# Patient Record
Sex: MALE | Race: WHITE | URBAN - METROPOLITAN AREA
[De-identification: names, ages, dates, MRNs, and addresses within clinical notes are randomized per-mention and may not be internally consistent; named-entity substitution may affect disease eponyms.]

---

## 2022-02-21 ENCOUNTER — OFFICE VISIT (OUTPATIENT)
Dept: INTERNAL MEDICINE CLINIC | Age: 22
End: 2022-02-21
Payer: MEDICAID

## 2022-02-21 VITALS
SYSTOLIC BLOOD PRESSURE: 138 MMHG | DIASTOLIC BLOOD PRESSURE: 82 MMHG | BODY MASS INDEX: 27.08 KG/M2 | WEIGHT: 211 LBS | HEART RATE: 90 BPM | HEIGHT: 74 IN

## 2022-02-21 DIAGNOSIS — E55.9 VITAMIN D DEFICIENCY: ICD-10-CM

## 2022-02-21 DIAGNOSIS — R61 EXCESSIVE SWEATING: ICD-10-CM

## 2022-02-21 DIAGNOSIS — E78.2 MIXED HYPERLIPIDEMIA: ICD-10-CM

## 2022-02-21 DIAGNOSIS — F17.200 TOBACCO USE DISORDER: ICD-10-CM

## 2022-02-21 DIAGNOSIS — I10 PRIMARY HYPERTENSION: ICD-10-CM

## 2022-02-21 DIAGNOSIS — F15.11 HISTORY OF AMPHETAMINE ABUSE (HCC): ICD-10-CM

## 2022-02-21 DIAGNOSIS — S69.91XS THUMB INJURY, RIGHT, SEQUELA: Primary | ICD-10-CM

## 2022-02-21 PROCEDURE — G8427 DOCREV CUR MEDS BY ELIG CLIN: HCPCS | Performed by: INTERNAL MEDICINE

## 2022-02-21 PROCEDURE — 99204 OFFICE O/P NEW MOD 45 MIN: CPT | Performed by: INTERNAL MEDICINE

## 2022-02-21 PROCEDURE — G8419 CALC BMI OUT NRM PARAM NOF/U: HCPCS | Performed by: INTERNAL MEDICINE

## 2022-02-21 PROCEDURE — G8484 FLU IMMUNIZE NO ADMIN: HCPCS | Performed by: INTERNAL MEDICINE

## 2022-02-21 PROCEDURE — 4004F PT TOBACCO SCREEN RCVD TLK: CPT | Performed by: INTERNAL MEDICINE

## 2022-02-21 RX ORDER — PHENOL 1.4 %
AEROSOL, SPRAY (ML) MUCOUS MEMBRANE NIGHTLY
COMMUNITY

## 2022-02-21 RX ORDER — GLYCOPYRROLATE 1.5 MG/1
1 TABLET ORAL 3 TIMES DAILY PRN
Qty: 90 TABLET | Refills: 0 | Status: SHIPPED | OUTPATIENT
Start: 2022-02-21

## 2022-02-21 RX ORDER — IBUPROFEN 600 MG/1
600 TABLET ORAL EVERY 6 HOURS PRN
COMMUNITY

## 2022-02-21 RX ORDER — CLONIDINE 0.1 MG/24H
1 PATCH, EXTENDED RELEASE TRANSDERMAL WEEKLY
COMMUNITY

## 2022-02-21 SDOH — ECONOMIC STABILITY: FOOD INSECURITY: WITHIN THE PAST 12 MONTHS, YOU WORRIED THAT YOUR FOOD WOULD RUN OUT BEFORE YOU GOT MONEY TO BUY MORE.: SOMETIMES TRUE

## 2022-02-21 SDOH — ECONOMIC STABILITY: FOOD INSECURITY: WITHIN THE PAST 12 MONTHS, THE FOOD YOU BOUGHT JUST DIDN'T LAST AND YOU DIDN'T HAVE MONEY TO GET MORE.: SOMETIMES TRUE

## 2022-02-21 ASSESSMENT — ENCOUNTER SYMPTOMS
SORE THROAT: 0
ABDOMINAL PAIN: 0
CONSTIPATION: 0
COUGH: 0
BACK PAIN: 0
CHEST TIGHTNESS: 0
COLOR CHANGE: 0
NAUSEA: 0
VOMITING: 0
SHORTNESS OF BREATH: 0
WHEEZING: 0

## 2022-02-21 ASSESSMENT — PATIENT HEALTH QUESTIONNAIRE - PHQ9
SUM OF ALL RESPONSES TO PHQ QUESTIONS 1-9: 0
SUM OF ALL RESPONSES TO PHQ QUESTIONS 1-9: 0
2. FEELING DOWN, DEPRESSED OR HOPELESS: 0
SUM OF ALL RESPONSES TO PHQ QUESTIONS 1-9: 0
SUM OF ALL RESPONSES TO PHQ QUESTIONS 1-9: 0
SUM OF ALL RESPONSES TO PHQ9 QUESTIONS 1 & 2: 0
1. LITTLE INTEREST OR PLEASURE IN DOING THINGS: 0

## 2022-02-21 ASSESSMENT — SOCIAL DETERMINANTS OF HEALTH (SDOH): HOW HARD IS IT FOR YOU TO PAY FOR THE VERY BASICS LIKE FOOD, HOUSING, MEDICAL CARE, AND HEATING?: SOMEWHAT HARD

## 2022-02-21 NOTE — ASSESSMENT & PLAN NOTE
Counseled regarding need for smoking cessation, he will start nicotine lozenges and will reevaluate in 3 months

## 2022-02-21 NOTE — ASSESSMENT & PLAN NOTE
Patient started on clonidine patches at the rehab facility, today's blood pressure is within reasonable control, counseled regarding need for maintaining low-salt low-fat diet, smoking cessation, he did have lab work completed about 3 weeks ago and he will have a copy faxed to the office.   Will reevaluate in 3 months

## 2022-02-21 NOTE — ASSESSMENT & PLAN NOTE
Currently enrolled in inpatient rehab with [facility] program, will continue to follow along.   He is reporting last use to be December 1, reinforced recommendation for complete abstinence even after release from rehab reinforced need to avoid smoking weed or use any substance with potential for habit-forming and addiction

## 2022-02-21 NOTE — PROGRESS NOTES
ASSESSMENT/PLAN:  1. Thumb injury, right, sequela  Assessment & Plan:   Patient reporting-year-old cut over the tendon the right thumb, currently with limited ability to actively flex right thumb DIP. Advised will refer to hand surgery for further evaluation and treatment if any is still recommended since the injury is dating back to 1 year however recommended he continues with exercises as this may help regain some of the function  Orders:  -     Polly Medina MD, Hand Surgery (Hand, Wrist, Upper Extremity), [facility]  2. History of amphetamine abuse [facility])  Assessment & Plan:   Currently enrolled in inpatient rehab with [facility], will continue to follow along. He is reporting last use to be December 1, reinforced recommendation for complete abstinence even after release from rehab reinforced need to avoid smoking weed or use any substance with potential for habit-forming and addiction  3. Primary hypertension  Assessment & Plan:   Patient started on clonidine patches at the rehab facility, today's blood pressure is within reasonable control, counseled regarding need for maintaining low-salt low-fat diet, smoking cessation, he did have lab work completed about 3 weeks ago and he will have a copy faxed to the office. Will reevaluate in 3 months  4. Vitamin D deficiency  5. Mixed hyperlipidemia  Assessment & Plan:   As stated above will obtain copy of lab work, he is currently on fish oil supplement, reinforced recommendations for diet and exercise as other means to help control cholesterol and lower risk for coronary artery disease  6. Tobacco use disorder  Assessment & Plan:   Counseled regarding need for smoking cessation, he will start nicotine lozenges and will reevaluate in 3 months  7. Excessive sweating  Assessment & Plan:    We will do a trial of glycopyrrolate and reevaluate in 3 months  Orders:  -     Glycopyrrolate 1.5 MG TABS; Take 1 tablet by mouth 3 times daily as needed (excessive sweating), Disp-90 tablet, R-0Normal      Return in about 3 months (around 5/21/2022). SUBJECTIVE  HPI:   Patient here to establish new patient office visits and complaining of thumb problem on the right-hand side  He is a 75-year-old male who was released from shelter January 31 into inpatient rehab program at [facility] due to history of amphetamine abuse. He is reporting last use to be around December 1. Is referred from [facility] today to establish new PCP office visit, he did have complete lab work 3 weeks ago however he forgot to bring a copy with him today, he was started on clonidine patches 2 weeks ago after his blood pressure continued to be elevated  He is currently an everyday smoker however he is picking up nicotine lozenges today to start using to help him with smoking cessation. He injured his thumb about a year ago while trying to hang lights on the wall and ended up sustaining a cut. States at that time he is pretty sure he did cut through the tendon and he went to urgent care where he had stitches placed but no further intervention. Ever since he has been having some discomfort and pain but what is bothering him the most is that he is not able to bend the thumb completely on his own. He also complaining of excessive sweating of both feet and wants to know if there is something he can take to help with that      Review of Systems   Constitutional: Negative for activity change, appetite change, fatigue and unexpected weight change. HENT: Negative for congestion, hearing loss, mouth sores and sore throat. Eyes: Negative for visual disturbance. Respiratory: Negative for cough, chest tightness, shortness of breath and wheezing. Cardiovascular: Negative for chest pain, palpitations and leg swelling. Gastrointestinal: Negative for abdominal pain, constipation, nausea and vomiting. Endocrine: Negative for cold intolerance and heat intolerance.    Genitourinary: Negative for difficulty urinating, dysuria, frequency, hematuria and urgency. Musculoskeletal: Positive for arthralgias. Negative for back pain, gait problem and joint swelling. Skin: Negative for color change. Allergic/Immunologic: Negative for environmental allergies and immunocompromised state. Neurological: Negative for dizziness, light-headedness and headaches. Psychiatric/Behavioral: Positive for sleep disturbance. Negative for behavioral problems, dysphoric mood and suicidal ideas. OBJECTIVE:    /82   Pulse 90   Ht 6' 2\" (1.88 m)   Wt 211 lb (95.7 kg)   BMI 27.09 kg/m²    Physical Exam  Vitals and nursing note reviewed. Constitutional:       General: He is not in acute distress. Appearance: Normal appearance. He is normal weight. He is not toxic-appearing. Comments: Patient is wearing an ankle monitor   HENT:      Head: Normocephalic. Eyes:      Conjunctiva/sclera: Conjunctivae normal.   Cardiovascular:      Rate and Rhythm: Normal rate and regular rhythm. Heart sounds: Normal heart sounds. Pulmonary:      Effort: Pulmonary effort is normal. No respiratory distress. Breath sounds: No wheezing. Abdominal:      Palpations: Abdomen is soft. Musculoskeletal:         General: Tenderness present. Normal range of motion. Cervical back: Neck supple. Skin:     General: Skin is warm and dry. Neurological:      General: No focal deficit present. Mental Status: He is alert and oriented to person, place, and time. Cranial Nerves: No cranial nerve deficit. Psychiatric:         Mood and Affect: Mood normal.           Electronically signed by Vladimir Luo MD on 2/21/2022 at 2:08 PM.    This dictation was generated by voice recognition computer software. Although all attempts are made to edit the dictation for accuracy, there may be errors in the transcription that are not intended.

## 2022-02-21 NOTE — ASSESSMENT & PLAN NOTE
As stated above will obtain copy of lab work, he is currently on fish oil supplement, reinforced recommendations for diet and exercise as other means to help control cholesterol and lower risk for coronary artery disease

## 2022-03-02 ENCOUNTER — OFFICE VISIT (OUTPATIENT)
Dept: INTERNAL MEDICINE CLINIC | Age: 22
End: 2022-03-02
Payer: MEDICAID

## 2022-03-02 VITALS
OXYGEN SATURATION: 97 % | BODY MASS INDEX: 15.99 KG/M2 | WEIGHT: 124.6 LBS | SYSTOLIC BLOOD PRESSURE: 118 MMHG | DIASTOLIC BLOOD PRESSURE: 78 MMHG | HEART RATE: 72 BPM | TEMPERATURE: 97.7 F | HEIGHT: 74 IN

## 2022-03-02 DIAGNOSIS — Q18.1 CYST ON EAR: Primary | ICD-10-CM

## 2022-03-02 PROCEDURE — 99213 OFFICE O/P EST LOW 20 MIN: CPT | Performed by: NURSE PRACTITIONER

## 2022-03-02 PROCEDURE — G8427 DOCREV CUR MEDS BY ELIG CLIN: HCPCS | Performed by: NURSE PRACTITIONER

## 2022-03-02 PROCEDURE — G8484 FLU IMMUNIZE NO ADMIN: HCPCS | Performed by: NURSE PRACTITIONER

## 2022-03-02 PROCEDURE — 4004F PT TOBACCO SCREEN RCVD TLK: CPT | Performed by: NURSE PRACTITIONER

## 2022-03-02 PROCEDURE — G8419 CALC BMI OUT NRM PARAM NOF/U: HCPCS | Performed by: NURSE PRACTITIONER

## 2022-03-02 RX ORDER — M-VIT,TX,IRON,MINS/CALC/FOLIC 27MG-0.4MG
1 TABLET ORAL DAILY
COMMUNITY

## 2022-03-02 RX ORDER — IBUPROFEN 600 MG/1
600 TABLET ORAL EVERY 6 HOURS PRN
Qty: 120 TABLET | Refills: 1 | Status: CANCELLED | OUTPATIENT
Start: 2022-03-02

## 2022-03-02 RX ORDER — CLONIDINE HYDROCHLORIDE 0.1 MG/1
0.1 TABLET ORAL 3 TIMES DAILY
COMMUNITY

## 2022-03-02 ASSESSMENT — ENCOUNTER SYMPTOMS
DIARRHEA: 0
WHEEZING: 0
SHORTNESS OF BREATH: 0
SINUS PRESSURE: 0
EYE PAIN: 0
CONSTIPATION: 0
VOMITING: 0
RHINORRHEA: 0
ABDOMINAL PAIN: 0
TROUBLE SWALLOWING: 0
NAUSEA: 0
SORE THROAT: 0
SINUS PAIN: 0
COUGH: 0

## 2022-03-02 NOTE — PROGRESS NOTES
Acute Office Visit  3/2/2022    SUBJECTIVE:    Patient ID: Guy Guan is a 19 y.o. male. Chief Complaint   Patient presents with    Skin Problem     lumps behind both ears, have been there for \"longer than a few months\"     HPI: The patient presents to the office for an acute visit. Pt reports \"lumps\" behind both ears bilaterally. States that these have been present for months. States they have grown in the past and then drain and then get bigger again. States he wants these removed. Not flared today. Denies drainage today. Denies open wounds. Denies fevers/chills. Denies pain. No Known Allergies     Current Outpatient Medications   Medication Sig Dispense Refill    cloNIDine (CATAPRES) 0.1 MG tablet Take 0.1 mg by mouth 3 times daily      Multiple Vitamins-Minerals (THERAPEUTIC MULTIVITAMIN-MINERALS) tablet Take 1 tablet by mouth daily      Omega-3 Fatty Acids (FISH OIL PO) Take by mouth daily      Cholecalciferol (VITAMIN D3) 125 MCG (5000 UT) TABS Take by mouth daily      ibuprofen (ADVIL;MOTRIN) 600 MG tablet Take 600 mg by mouth every 6 hours as needed for Pain      Glycopyrrolate 1.5 MG TABS Take 1 tablet by mouth 3 times daily as needed (excessive sweating) 90 tablet 0    Melatonin 10 MG TABS Take by mouth nightly (Patient not taking: Reported on 3/2/2022)      cloNIDine (CATAPRES) 0.1 MG/24HR PTWK Place 1 patch onto the skin once a week (Patient not taking: Reported on 3/2/2022)       No current facility-administered medications for this visit. Review of Systems   Constitutional: Negative for appetite change, chills, diaphoresis, fatigue and fever. HENT: Negative for congestion, drooling, ear discharge, ear pain, hearing loss, postnasal drip, rhinorrhea, sinus pressure, sinus pain, sneezing, sore throat and trouble swallowing. Lumps behind bilateral ears   Eyes: Negative for pain and visual disturbance.    Respiratory: Negative for cough, shortness of breath and wheezing. Cardiovascular: Negative for chest pain and palpitations. Gastrointestinal: Negative for abdominal pain, constipation, diarrhea, nausea and vomiting. Skin: Negative for pallor. Neurological: Negative for dizziness, light-headedness and headaches. OBJECTIVE:  /78 (Site: Right Upper Arm, Position: Sitting)   Pulse 72   Temp 97.7 °F (36.5 °C) (Temporal)   Ht 6' 2\" (1.88 m)   Wt 124 lb 9.6 oz (56.5 kg)   SpO2 97%   BMI 16.00 kg/m²    Physical Exam  Vitals reviewed. Constitutional:       General: He is not in acute distress. Appearance: He is well-developed. He is not diaphoretic. HENT:      Head: Normocephalic and atraumatic. Right Ear: Hearing, tympanic membrane and external ear normal.      Left Ear: Hearing, tympanic membrane and external ear normal.   Eyes:      Pupils: Pupils are equal, round, and reactive to light. Cardiovascular:      Rate and Rhythm: Normal rate and regular rhythm. Pulmonary:      Effort: Pulmonary effort is normal. No respiratory distress. Breath sounds: Normal breath sounds. No stridor. No wheezing. Abdominal:      Palpations: Abdomen is soft. Skin:     General: Skin is warm and dry. Neurological:      Mental Status: He is alert and oriented to person, place, and time. ASSESSMENT/PLAN:  Rosendo Nicholson was seen today for skin problem. Diagnoses and all orders for this visit:    Cyst on ear  -     Skin colored. No open wounds. Does not appear infected. Possible sebaceous cyst. Afebrile. TMs normal bilaterally. - Not flared right now, but states this flares and then drains and then increases in size again. Pt would like these removed. - Recommend general surgeon referral.  - Colleen Spencer MD, General Surgery, [facility]    Patient wondering if PCP received labs that were faxed over. Message sent to PCP. Return for as previously scheduled or sooner if needed.     Pt informed to call if symptoms worsen or fail to improve. All questions answered. Patient states no further questions or concerns at this time.     Electronically signed by TWAN Bueno CNP 03/02/22

## 2022-03-16 ENCOUNTER — OFFICE VISIT (OUTPATIENT)
Dept: SURGERY | Age: 22
End: 2022-03-16
Payer: MEDICAID

## 2022-03-16 VITALS
WEIGHT: 218 LBS | BODY MASS INDEX: 27.98 KG/M2 | SYSTOLIC BLOOD PRESSURE: 110 MMHG | HEIGHT: 74 IN | DIASTOLIC BLOOD PRESSURE: 68 MMHG

## 2022-03-16 DIAGNOSIS — L72.3 SEBACEOUS CYST: ICD-10-CM

## 2022-03-16 PROCEDURE — 4004F PT TOBACCO SCREEN RCVD TLK: CPT | Performed by: SURGERY

## 2022-03-16 PROCEDURE — 99203 OFFICE O/P NEW LOW 30 MIN: CPT | Performed by: SURGERY

## 2022-03-16 PROCEDURE — G8484 FLU IMMUNIZE NO ADMIN: HCPCS | Performed by: SURGERY

## 2022-03-16 PROCEDURE — G8427 DOCREV CUR MEDS BY ELIG CLIN: HCPCS | Performed by: SURGERY

## 2022-03-16 PROCEDURE — G8419 CALC BMI OUT NRM PARAM NOF/U: HCPCS | Performed by: SURGERY

## 2022-03-16 ASSESSMENT — ENCOUNTER SYMPTOMS
APNEA: 0
COLOR CHANGE: 0
ABDOMINAL PAIN: 0
EYE ITCHING: 0
ABDOMINAL DISTENTION: 0
CHEST TIGHTNESS: 0
BACK PAIN: 0
EYE DISCHARGE: 0

## 2022-03-16 NOTE — PROGRESS NOTES
Packs/day: 0.50     Years: 10.00     Pack years: 5.00     Types: Cigarettes    Smokeless tobacco: Never Used   Substance and Sexual Activity    Alcohol use: Not Currently    Drug use: Not Currently     Types: Marijuana Gelene Chasten), Methamphetamines (Crystal Meth)    Sexual activity: Not Currently   Other Topics Concern    Not on file   Social History Narrative    Not on file     Social Determinants of Health     Financial Resource Strain: Medium Risk    Difficulty of Paying Living Expenses: Somewhat hard   Food Insecurity: Food Insecurity Present    Worried About Running Out of Food in the Last Year: Sometimes true    Shar of Food in the Last Year: Sometimes true   Transportation Needs:     Lack of Transportation (Medical): Not on file    Lack of Transportation (Non-Medical): Not on file   Physical Activity:     Days of Exercise per Week: Not on file    Minutes of Exercise per Session: Not on file   Stress:     Feeling of Stress : Not on file   Social Connections:     Frequency of Communication with Friends and Family: Not on file    Frequency of Social Gatherings with Friends and Family: Not on file    Attends Sabianist Services: Not on file    Active Member of [address] [place] [address] [employer] or Organizations: Not on file    Attends Club or Organization Meetings: Not on file    Marital Status: Not on file   Intimate Partner Violence:     Fear of Current or Ex-Partner: Not on file    Emotionally Abused: Not on file    Physically Abused: Not on file    Sexually Abused: Not on file   Housing Stability:     Unable to Pay for Housing in the Last Year: Not on file    Number of [place] in the Last Year: Not on file    Unstable Housing in the Last Year: Not on file      History reviewed. No pertinent family history.   Current Outpatient Medications   Medication Sig Dispense Refill    cloNIDine (CATAPRES) 0.1 MG tablet Take 0.1 mg by mouth 3 times daily      Multiple Vitamins-Minerals (THERAPEUTIC MULTIVITAMIN-MINERALS) tablet Take 1 tablet by mouth daily      Omega-3 Fatty Acids (FISH OIL PO) Take by mouth daily      Cholecalciferol (VITAMIN D3) 125 MCG (5000 UT) TABS Take by mouth daily      Melatonin 10 MG TABS Take by mouth nightly       cloNIDine (CATAPRES) 0.1 MG/24HR PTWK Place 1 patch onto the skin once a week       ibuprofen (ADVIL;MOTRIN) 600 MG tablet Take 600 mg by mouth every 6 hours as needed for Pain      Glycopyrrolate 1.5 MG TABS Take 1 tablet by mouth 3 times daily as needed (excessive sweating) 90 tablet 0     No current facility-administered medications for this visit. No Known Allergies     OBJECTIVE:  /68   Ht 6' 2\" (1.88 m)   Wt 218 lb (98.9 kg)   BMI 27.99 kg/m²    Physical Exam  Vitals reviewed. Constitutional:       Appearance: He is well-developed. HENT:      Head: Normocephalic and atraumatic. Eyes:      Conjunctiva/sclera: Conjunctivae normal.      Pupils: Pupils are equal, round, and reactive to light. Skin:     General: Skin is warm and dry. Comments: Sebaceous cyst just inferior to the ear on both sides left greater than right fixed to skin but mobile, does not change with mastication or swallowing, no signs of infection and nontender   Neurological:      Mental Status: He is alert and oriented to person, place, and time. Labs:  No results found for any previous visit. Imaging:  No results found. ASSESSMENT:  Bilateral neck sebaceous cysts    PLAN:  1. We discussed the risks of surgery including bleeding, infection, as well as the cost and pain related to the procedure. Benefits of excision include resolution of symptoms and definitive tissue diagnosis. Alternatives include close observation. The patient understands, agrees, and wishes to proceed with excision  2. We also discussed anesthesia options including general anesthesia, local anesthetic only, and local anesthetic with sedation.  General anesthesia provides the most patient relaxation but at the cost of higher risk as it includes endotracheal intubation. Local anesthetic poses the least risk to the patient but is the most uncomfortable. Additional benefit of local anesthetic only is that the patient could drive home from the procedure. The patient has chosen local anesthetic only  3. Will schedule for excision of sebaceous cyst on bilateral neck with local anesthetic only as outpatient procedure to be performed in office     Dong Jamil MD, FACS  3/16/2022  1:28 PM

## 2022-03-16 NOTE — PATIENT INSTRUCTIONS
1. We discussed the risks of surgery including bleeding, infection, as well as the cost and pain related to the procedure. Benefits of excision include resolution of symptoms and definitive tissue diagnosis. Alternatives include close observation. The patient understands, agrees, and wishes to proceed with excision  2. We also discussed anesthesia options including general anesthesia, local anesthetic only, and local anesthetic with sedation. General anesthesia provides the most patient relaxation but at the cost of higher risk as it includes endotracheal intubation. Local anesthetic poses the least risk to the patient but is the most uncomfortable. Additional benefit of local anesthetic only is that the patient could drive home from the procedure. The patient has chosen local anesthetic only  3.  Will schedule for excision of sebaceous cyst on bilateral neck with local anesthetic only as outpatient procedure to be performed in office

## 2022-03-25 ENCOUNTER — PROCEDURE VISIT (OUTPATIENT)
Dept: SURGERY | Age: 22
End: 2022-03-25
Payer: MEDICAID

## 2022-03-25 VITALS — SYSTOLIC BLOOD PRESSURE: 108 MMHG | DIASTOLIC BLOOD PRESSURE: 60 MMHG | WEIGHT: 218 LBS | BODY MASS INDEX: 27.99 KG/M2

## 2022-03-25 DIAGNOSIS — L72.3 SEBACEOUS CYST: Primary | ICD-10-CM

## 2022-03-25 PROCEDURE — 11421 EXC H-F-NK-SP B9+MARG 0.6-1: CPT | Performed by: SURGERY

## 2022-03-25 PROCEDURE — 11422 EXC H-F-NK-SP B9+MARG 1.1-2: CPT | Performed by: SURGERY

## 2022-03-25 NOTE — PATIENT INSTRUCTIONS
Reapply antibiotic ointment daily and May shower after 48 hours.  Avoid scrubbing over incision  Pain control with tylenol and ibuprofen with food as needed  Return to office in 10-14 days for suture removal  Will discuss pathology at return office visit

## 2022-03-25 NOTE — PROGRESS NOTES
Office Procedure Note  Indications:   Kvng Delaney   : 2000   Today: 3/25/2022  19 y.o. male presents for excision of tender recurrent infected sebaceous cysts on bilateral neck    Procedure: Excision of sebaceous cyst on left neck    Anesthesia: Subcutaneous lidocaine    Procedure Details   Using clean technique, the sebaceous cyst on left neck was cleaned with alcohol scrub. Local anesthetic was injected around the cyst circumferentially. An elliptical incision was made around the cyst. The widest diameter of the mass was 1.1 cm and The length of the incision was 2 cm. The cyst was removed in its entirety and passed off as specimen. The incision was closed with interrupted 4-0 nylon suture. The wound was dressed with antibiotic ointment. The patient tolerated the procedure well without complications    Procedure: Excision of skin lesion on right neck    Anesthesia: Subcutaneous lidocaine    Procedure Details   Using clean technique, the sebaceous cyst on right neck was cleaned with alcohol scrub. Local anesthetic was injected around the cyst circumferentially. An elliptical incision was made around the cyst. The widest diameter of the mass was 1.3 cm and The length of the incision was 1 cm. The cyst was removed in its entirety and passed off as specimen. The incision was closed with interrupted 4-0 nylon suture. The wound was dressed with antibiotic ointment. The patient tolerated the procedure well without complications      Bleeding:  Minimal    Hemostasis Achieved:  by pressure    Response to treatment:  Well tolerated by patient. Post-procedure dressing:  antibiotic ointment    Post-procedure instructions:  Reapply antibiotic ointment daily and May shower after 48 hours. Avoid scrubbing over incision  Pain control with tylenol and ibuprofen with food as needed  Return to office in 10-14 days for suture removal  Will discuss pathology at return office visit    Imtiaz Salgado.  Dale Yan MD, FACS  3/25/2022  11:23 AM

## 2022-04-08 ENCOUNTER — OFFICE VISIT (OUTPATIENT)
Dept: SURGERY | Age: 22
End: 2022-04-08

## 2022-04-08 VITALS — DIASTOLIC BLOOD PRESSURE: 78 MMHG | WEIGHT: 224 LBS | BODY MASS INDEX: 28.76 KG/M2 | SYSTOLIC BLOOD PRESSURE: 136 MMHG

## 2022-04-08 DIAGNOSIS — Z09 SURGERY FOLLOW-UP: Primary | ICD-10-CM

## 2022-04-08 PROCEDURE — 99024 POSTOP FOLLOW-UP VISIT: CPT | Performed by: SURGERY

## 2022-04-08 NOTE — PROGRESS NOTES
[facility]  SUBJECTIVE:    Mukund Ill   2000   19 y.o. male presents for routine postoperative followup after excision of sebaceous cyst behind both ears on the neck on March 25, 2022. Incisional pain minimal.  No major complaints. Presents primarily to discuss pathology and suture removal    No past medical history on file. Past Surgical History:   Procedure Laterality Date    HERNIA REPAIR       Social History     Socioeconomic History    Marital status: Single     Spouse name: Not on file    Number of children: 0    Years of education: Not on file    Highest education level: Not on file   Occupational History    Not on file   Tobacco Use    Smoking status: Current Every Day Smoker     Packs/day: 0.50     Years: 10.00     Pack years: 5.00     Types: Cigarettes    Smokeless tobacco: Never Used   Vaping Use    Vaping Use: Never used   Substance and Sexual Activity    Alcohol use: Not Currently    Drug use: Not Currently     Types: Marijuana ([facility]), Methamphetamines (Crystal Meth)    Sexual activity: Not Currently   Other Topics Concern    Not on file   Social History Narrative    Not on file     Social Determinants of Health     Financial Resource Strain: Medium Risk    Difficulty of Paying Living Expenses: Somewhat hard   Food Insecurity: Food Insecurity Present    Worried About Running Out of Food in the Last Year: Sometimes true    Shar of Food in the Last Year: Sometimes true   Transportation Needs:     Lack of Transportation (Medical): Not on file    Lack of Transportation (Non-Medical):  Not on file   Physical Activity:     Days of Exercise per Week: Not on file    Minutes of Exercise per Session: Not on file   Stress:     Feeling of Stress : Not on file   Social Connections:     Frequency of Communication with Friends and Family: Not on file    Frequency of Social Gatherings with Friends and Family: Not on file    Attends Zoroastrianism Services: Not on file    Active Member of Clubs or Organizations: Not on file    Attends Club or Organization Meetings: Not on file    Marital Status: Not on file   Intimate Partner Violence:     Fear of Current or Ex-Partner: Not on file    Emotionally Abused: Not on file    Physically Abused: Not on file    Sexually Abused: Not on file   Housing Stability:     Unable to Pay for Housing in the Last Year: Not on file    Number of [place] in the Last Year: Not on file    Unstable Housing in the Last Year: Not on file      Family History   Problem Relation Age of Onset    Other Father         stomach ulcer     Current Outpatient Medications   Medication Sig Dispense Refill    cloNIDine (CATAPRES) 0.1 MG tablet Take 0.1 mg by mouth 3 times daily      Multiple Vitamins-Minerals (THERAPEUTIC MULTIVITAMIN-MINERALS) tablet Take 1 tablet by mouth daily      Omega-3 Fatty Acids (FISH OIL PO) Take by mouth daily      Cholecalciferol (VITAMIN D3) 125 MCG (5000 UT) TABS Take by mouth daily      Melatonin 10 MG TABS Take by mouth nightly       cloNIDine (CATAPRES) 0.1 MG/24HR PTWK Place 1 patch onto the skin once a week       ibuprofen (ADVIL;MOTRIN) 600 MG tablet Take 600 mg by mouth every 6 hours as needed for Pain      Glycopyrrolate 1.5 MG TABS Take 1 tablet by mouth 3 times daily as needed (excessive sweating) 90 tablet 0     No current facility-administered medications for this visit. No Known Allergies     Review of Systems:  Review of systems performed and negative with the exception of the above findings    OBJECTIVE:  /78   Wt 224 lb (101.6 kg)   BMI 28.76 kg/m²      Physical Exam:  General appearance: alert, appears stated age, cooperative and no distress  Skin/wound: Skin behind both ears clean dry and intact around incision, sutures removed without incident    No visits with results within 6 Week(s) from this visit.    Latest known visit with results is:   No results found for any previous visit. No results found. Pathology:  FINAL DIAGNOSIS:        A. Skin of right neck, excision:        - Features consistent with ruptured follicular cyst.      B. Skin of left neck, excision:        - Follicular cyst, infundibular type. REJI/REJI       Assessment:  excision of sebaceous cyst behind both ears on the neck on March 25, 2022    Plan:  1. Local wound care with dry dressing as needed  2. No restrictions with regard to activity, bathing, swimming  3. No restrictions with regard to diet  4. No restrictions with regard to activity  5. Pathology benign, no further screening or follow-up necessary  6. Follow with general surgery as needed    Dong Littlejohn MD, FACS  4/8/2022  1:15 PM

## 2022-04-08 NOTE — PATIENT INSTRUCTIONS
1. Local wound care with dry dressing as needed  2. No restrictions with regard to activity, bathing, swimming  3. No restrictions with regard to diet  4. No restrictions with regard to activity  5. Pathology benign, no further screening or follow-up necessary  6.  Follow with general surgery as needed

## 2022-04-13 ENCOUNTER — OFFICE VISIT (OUTPATIENT)
Dept: ORTHOPEDIC SURGERY | Age: 22
End: 2022-04-13
Payer: MEDICAID

## 2022-04-13 VITALS — HEIGHT: 74 IN | RESPIRATION RATE: 16 BRPM | WEIGHT: 224 LBS | BODY MASS INDEX: 28.75 KG/M2

## 2022-04-13 DIAGNOSIS — S61.209S FLEXOR TENDON LACERATION, FINGER, OPEN WOUND, SEQUELA: Primary | ICD-10-CM

## 2022-04-13 DIAGNOSIS — S56.129S FLEXOR TENDON LACERATION, FINGER, OPEN WOUND, SEQUELA: Primary | ICD-10-CM

## 2022-04-13 PROCEDURE — G8419 CALC BMI OUT NRM PARAM NOF/U: HCPCS | Performed by: ORTHOPAEDIC SURGERY

## 2022-04-13 PROCEDURE — G8427 DOCREV CUR MEDS BY ELIG CLIN: HCPCS | Performed by: ORTHOPAEDIC SURGERY

## 2022-04-13 PROCEDURE — 99243 OFF/OP CNSLTJ NEW/EST LOW 30: CPT | Performed by: ORTHOPAEDIC SURGERY

## 2022-04-13 NOTE — PATIENT INSTRUCTIONS
Thank you for choosing [facility]) Physicians for your Hand and Upper Extremity needs. If we can be of any further assistance to you, please do not hesitate to contact us.     Office Phone Number:  [phone]  or  [phone]

## 2022-04-13 NOTE — PROGRESS NOTES
Mr. Julian Hoang is a 19 y.o. right handed man  who is seen today in Hand Surgical Consultation at the request of Judith Romano MD.    He is seen today regarding an injury occurring approximately 1 year ago in 03/2021 . He reports having sustained a laceration to the Volar aspect of the  right Thumb when he accidentally cut it while trying to catch a light that was falling. .  At the time of injury, there was substantial bleeding & there was not acute sensory loss. He was seen for Emergency evaluation elsewhere, radiographs were obtained. By report, his skin injury was repaired after the wound was thoroughly cleansed. The nail structures were not involved in the injury and did not require repair at the time. HE DID NOT SEEK OR OBTAIN ANY CARE FOR THIS INJURY SINCE THE ER VISIT. He reports mild pain when he tries to flex his thumb. His laceration is well healed, but he continue to have trouble with flexion of his thumb. No tenderness of the wrist or elbow. He notes today, no numbness in the Radial Digital Nerve and Ulnar Digital Nerve distribution of the Thumb. Symptoms show no change over time. I have today reviewed with Julian Hoang the clinically relevant, past medical history, medications, allergies,  family history, social history, and Review Of Systems & I have documented any details relevant to today's presenting complaints in my history above. Mr. Roz Bearden self-reported past medical history, medications, allergies,  family history, social history, and Review Of Systems have been scanned into the chart under the \"Media\" tab. Physical Exam:  Mr. Roz Bearden most recent vitals:  Vitals  Resp: 16  Height: 6' 2\" (188 cm)  Weight: 224 lb (101.6 kg)    He is well nourished, oriented to person, place & time. He demonstrates appropriate mood and affect as well as normal gait and station. Skin:Normal in appearance, Normal Color and Free of Lesions Bilaterally.  Prior lacerations are fully healed, non tender and without hypersensitivity. Digital range of motion is limited in IP thumb flexion on the right, without significant limitation in other digits bilaterally . FDS function is Intact in the Index Finger, Middle Finger, Ring Finger and Small Finger, FDP function is Intact in the Index Finger, Middle Finger, Ring Finger and Small Finger, common extensor function is Intact in the Index Finger, Middle Finger, Ring Finger and Small Finger. Thumb EPL Function is Intact, Thumb EPB Function is Intact, Thumb FPL Function is weak with Limited active FPL excursion and an active arc of flexion of 30-40* with blocking the metacarpo-phalangeal joint in extension. All other digits demonstrate intact tendon function and motion bilaterally. Wrist range of motion is Full bilaterally  There is no evidence of gross joint instability bilaterally. Muscular strength is clinically appropriate bilaterally, excepting the injured structures noted above. Sensation is normal in the Radial Digital Nerve and Ulnar Digital Nerve distribution of the right Thumb all other digits demonstrate normal sensation bilaterally. Vascular examination reveals normal, good capillary refill and good color bilaterally. There is no ecchymosis in the injured digit(s). Swelling is absent in the Thumb, absent elsewhere bilaterally  Minimal pain is elicited with palpation of the Distal region of the Thumb on the Right, normal on the Left. The base of the hand & wrist are not tender to palpation      Impression:  Mr. Phan Finley has sustained right Thumb laceration without associated nerve laceration, with a probable  Partial Flexor Pollicis Longus  tendon laceration. He  presents requesting further treatment. Plan:  Due to the chronicity of his injury it is not acutely reparable at this time.  As he does not report significant functional limitation to me, there is no indication for more aggressive intervention such as injection or surgical treatment of his  condition at this time. He is instructed in the judicious use of over-the-counter anti-inflammatory medications or pain relievers for his symptoms if allowed by his  primary care physician. I have asked Mr. Mitzi Goldstein  to feel free to contact me or schedule a follow-up appointment at any time that he feels the need for any further evaluation or treatment for his upper extremitiy condition. If he feels that he continues to be feeling and functioning well, he may choose not to seek any further follow-up or treatment at his discretion. I will remain available to continue his care at any time in the future.

## 2023-02-02 ENCOUNTER — HOSPITAL ENCOUNTER (EMERGENCY)
Age: 23
Discharge: HOME | End: 2023-02-02
Payer: COMMERCIAL

## 2023-02-02 VITALS
DIASTOLIC BLOOD PRESSURE: 84 MMHG | SYSTOLIC BLOOD PRESSURE: 150 MMHG | HEART RATE: 83 BPM | OXYGEN SATURATION: 97 % | BODY MASS INDEX: 29.53 KG/M2 | RESPIRATION RATE: 18 BRPM | WEIGHT: 230 LBS | TEMPERATURE: 98.6 F
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
## 2023-02-02 DIAGNOSIS — S09.90XA CLOSED HEAD INJURY, INITIAL ENCOUNTER: ICD-10-CM

## 2023-02-02 PROCEDURE — 6360000002 HC RX W HCPCS: Performed by: NURSE PRACTITIONER

## 2023-02-02 PROCEDURE — 99284 EMERGENCY DEPT VISIT MOD MDM: CPT

## 2023-02-02 PROCEDURE — 90471 IMMUNIZATION ADMIN: CPT | Performed by: NURSE PRACTITIONER

## 2023-02-02 PROCEDURE — 90714 TD VACC NO PRESV 7 YRS+ IM: CPT | Performed by: NURSE PRACTITIONER

## 2023-02-02 RX ADMIN — CLOSTRIDIUM TETANI TOXOID ANTIGEN (FORMALDEHYDE INACTIVATED) AND CORYNEBACTERIUM DIPHTHERIAE TOXOID ANTIGEN (FORMALDEHYDE INACTIVATED) 0.5 ML: 5; 2 INJECTION, SUSPENSION INTRAMUSCULAR at 02:04

## 2023-02-02 ASSESSMENT — ENCOUNTER SYMPTOMS
ABDOMINAL PAIN: 0
DIARRHEA: 0
CHEST TIGHTNESS: 0
NAUSEA: 0
VOMITING: 0
SHORTNESS OF BREATH: 0

## 2023-02-02 ASSESSMENT — PAIN SCALES - GENERAL: PAINLEVEL_OUTOF10: 4

## 2023-02-02 ASSESSMENT — PAIN - FUNCTIONAL ASSESSMENT: PAIN_FUNCTIONAL_ASSESSMENT: 0-10

## 2023-02-02 NOTE — Clinical Note
Bethany Cruz was seen and treated in our emergency department on 2/2/2023. He may return to work on 02/02/2023. Can return without restrictions- must follow up with occupational medicine for paperwork     If you have any questions or concerns, please don't hesitate to call.       Giancarlo Coello, TWAN - CNP

## 2023-02-02 NOTE — ED PROVIDER NOTES
[facility]        Pt Name: Joy Lawler  MRN: [id number]  [place] 2000  Date of evaluation: 2/2/2023  Provider: TWAN Arguello - EVIN  PCP: Candelario Pruitt MD  Note Started: 2:00 AM EST 2/2/23      ABE. I have evaluated this patient. My supervising physician was available for consultation. CHIEF COMPLAINT       Chief Complaint   Patient presents with    Head Injury     Pt. Was at work for [employer] and hit his head on a large piece of metal.        HISTORY OF PRESENT ILLNESS: 1 or more Elements     History from : Patient    Limitations to history : None    Joy Lawelr is a 23 y.o. male who presents to the emergency department for evaluation of head injury. 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  There was no loss of consciousness but the patient was dizzy momentarily. Here for evaluation of wound to his right parietal scalp that was bleeding. Uncertain of tetanus status. This is a work related injury. Patient reports that he is returned to baseline, not dizzy or nauseated. Denies any fever, visual disturbances. No chest pain or pressure. No neck or back pain. No shortness of breath, cough, or congestion. No abdominal pain, nausea, vomiting, diarrhea, constipation, or dysuria. No rash. Nursing Notes were all reviewed and agreed with or any disagreements were addressed in the HPI. REVIEW OF SYSTEMS :      Review of Systems   Constitutional:  Negative for activity change, chills and fever. Respiratory:  Negative for chest tightness and shortness of breath. Cardiovascular:  Negative for chest pain. Gastrointestinal:  Negative for abdominal pain, diarrhea, nausea and vomiting. Genitourinary:  Negative for dysuria. Skin:  Positive for wound.    All other systems reviewed and are negative. Positives and Pertinent negatives as per HPI. SURGICAL HISTORY     Past Surgical History:   Procedure Laterality Date    HERNIA REPAIR         CURRENTMEDICATIONS       Current Discharge Medication List          ALLERGIES     Patient has no known allergies. FAMILYHISTORY       Family History   Problem Relation Age of Onset    Other Father         stomach ulcer        SOCIAL HISTORY       Social History     Tobacco Use    Smoking status: Every Day     Packs/day: 0.50     Years: 10.00     Pack years: 5.00     Types: Cigarettes    Smokeless tobacco: Never   Vaping Use    Vaping Use: Never used   Substance Use Topics    Alcohol use: Not Currently    Drug use: Not Currently       SCREENINGS        [place] Coma Scale  Eye Opening: Spontaneous  Best Verbal Response: Oriented  Best Motor Response: Obeys commands  [place] Coma Scale Score: 15                CIWA Assessment  BP: (!) 150/84  Heart Rate: 83           PHYSICAL EXAM  1 or more Elements     ED Triage Vitals [02/02/23 0113]   BP Temp Temp Source Heart Rate Resp SpO2 Height Weight   (!) 150/84 98.6 °F (37 °C) Oral 83 18 97 % -- 230 lb (104.3 kg)       Physical Exam  Vitals and nursing note reviewed. Constitutional:       Appearance: He is well-developed. He is not diaphoretic. HENT:      Head: Normocephalic. Comments: Abrasion, no laceration. NOT requiring repair. No active bleeding. Right Ear: Tympanic membrane and external ear normal.      Left Ear: Tympanic membrane and external ear normal.      Mouth/Throat:      Pharynx: Oropharynx is clear. Eyes:      General:         Right eye: No discharge. Left eye: No discharge. Neck:      Vascular: No JVD. Cardiovascular:      Rate and Rhythm: Normal rate. Pulses: Normal pulses. Heart sounds: Normal heart sounds. Pulmonary:      Effort: Pulmonary effort is normal. No respiratory distress. Breath sounds: Normal breath sounds.    Abdominal:      Palpations: Abdomen is soft. Musculoskeletal:         General: Normal range of motion. Skin:     General: Skin is warm and dry. Coloration: Skin is not pale. Neurological:      Mental Status: He is alert. GCS: GCS eye subscore is 4. GCS verbal subscore is 5. GCS motor subscore is 6. Cranial Nerves: Cranial nerves 2-12 are intact. Sensory: Sensation is intact. Motor: Motor function is intact. Psychiatric:         Behavior: Behavior normal.           DIAGNOSTIC RESULTS   LABS:    Labs Reviewed - No data to display    When ordered only abnormal lab results are displayed. All other labs were within normal range or not returned as of this dictation. EKG: When ordered, EKG's are interpreted by the Emergency Department Physician in the absence of a cardiologist.  Please see their note for interpretation of EKG. RADIOLOGY:   Non-plain film images such as CT, Ultrasound and MRI are read by the radiologist. Plain radiographic images are visualized and preliminarily interpreted by the ED Provider with the below findings:        Interpretation per the Radiologist below, if available at the time of this note:    No orders to display     No results found. No results found. PROCEDURES   Unless otherwise noted below, none     Procedures    CRITICAL CARE TIME (.cctime)       PAST MEDICAL HISTORY      has no past medical history on file. Chronic Conditions affecting Care: none    EMERGENCY DEPARTMENT COURSE and DIFFERENTIAL DIAGNOSIS/MDM:   Vitals:    Vitals:    02/02/23 0113   BP: (!) 150/84   Pulse: 83   Resp: 18   Temp: 98.6 °F (37 °C)   TempSrc: Oral   SpO2: 97%   Weight: 230 lb (104.3 kg)       Patient was given the following medications:  Medications   tetanus & diphtheria toxoids (adult) 5-2 LFU injection 0.5 mL (has no administration in time range)             Is this patient to be included in the SEP-1 Core Measure due to severe sepsis or septic shock?    No   Exclusion criteria - the patient is NOT to be included for SEP-1 Core Measure due to: Infection is not suspected    CONSULTS: (Who and What was discussed)  None  Discussion with Other Profesionals : None    Social Determinants : None    Records Reviewed : None    CC/HPI Summary, DDx, ED Course, and Reassessment:     Briefly, this is a 23year old male who presents to the emergency department for evaluation of head injury. 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  There was no loss of consciousness but the patient was dizzy momentarily. Here for evaluation of wound to his right parietal scalp that was bleeding. Uncertain of tetanus status. This is a work related injury. Scalp abrasion was cleaned, no need for suture, staples, or other repair. No longer bleedng. Unremarkable neuro exam.    I estimate there is LOW risk for SKULL FRACTURE, INTRACRANIAL HEMORRHAGE, CERVICAL SPINE INJURY, SUBDURAL OR EPIDURAL HEMATOMA,     I completed a structured, evidence-based clinical evaluation to screen for neurologic deficits in this patient. The patient has a normal detailed neurologic exam    I feel the patient can be safely discharged to home with outpatient follow up. Instructions have been given for the patient to return if there is any significant worsening of the headache or the development of confusion, vision change, weakness, numbness, difficulty with speech or walking, or any other concerns     Disposition Considerations (include 1 Tests not done, Shared Decision Making, Pt Expectation of Test or Tx.): shared decision making used throughout. Referral to [address] [place] [address] for occupational medicine paperwork to be completed. The patient can return to work without restriction. Appropriate for outpatient management discharge home. I am the Primary Clinician of Record. FINAL IMPRESSION      1.  Work related injury 2. Closed head injury, initial encounter          DISPOSITION/PLAN     DISPOSITION Decision To Discharge 02/02/2023 01:52:52 AM      PATIENT REFERRED TO:  Romario Zimmerman MD  [address]  [phone]    Schedule an appointment as soon as possible for a visit       [address]   [address] [facility]  [phone]    Schedule an appointment as soon as possible for a visit       DISCHARGE MEDICATIONS:  Current Discharge Medication List          DISCONTINUED MEDICATIONS:  Current Discharge Medication List        STOP taking these medications       cloNIDine (CATAPRES) 0.1 MG tablet Comments:   Reason for Stopping:         Multiple Vitamins-Minerals (THERAPEUTIC MULTIVITAMIN-MINERALS) tablet Comments:   Reason for Stopping:         Omega-3 Fatty Acids (FISH OIL PO) Comments:   Reason for Stopping:         Cholecalciferol (VITAMIN D3) 125 MCG (5000 UT) TABS Comments:   Reason for Stopping:         Melatonin 10 MG TABS Comments:   Reason for Stopping:         cloNIDine (CATAPRES) 0.1 MG/24HR PTWK Comments:   Reason for Stopping:         ibuprofen (ADVIL;MOTRIN) 600 MG tablet Comments:   Reason for Stopping:         Glycopyrrolate 1.5 MG TABS Comments:   Reason for Stopping:                      (Please note that portions of this note were completed with a voice recognition program.  Efforts were made to edit the dictations but occasionally words are mis-transcribed.)    TWAN Daniel CNP (electronically signed)           TWAN Daniel CNP  02/02/23 0205